# Patient Record
Sex: FEMALE | URBAN - NONMETROPOLITAN AREA
[De-identification: names, ages, dates, MRNs, and addresses within clinical notes are randomized per-mention and may not be internally consistent; named-entity substitution may affect disease eponyms.]

---

## 2020-11-17 ENCOUNTER — OFFICE VISIT (OUTPATIENT)
Dept: URBAN - NONMETROPOLITAN AREA CLINIC 13 | Facility: CLINIC | Age: 80
End: 2020-11-17
Payer: MEDICARE

## 2020-11-17 VITALS
HEART RATE: 80 BPM | BODY MASS INDEX: 41.22 KG/M2 | WEIGHT: 247.4 LBS | HEIGHT: 65 IN | DIASTOLIC BLOOD PRESSURE: 74 MMHG | SYSTOLIC BLOOD PRESSURE: 147 MMHG

## 2020-11-17 DIAGNOSIS — R19.00 ABDOMINAL SWELLING: ICD-10-CM

## 2020-11-17 DIAGNOSIS — Z12.11 COLON CANCER SCREENING: ICD-10-CM

## 2020-11-17 DIAGNOSIS — R19.7 DIARRHEA, UNSPECIFIED TYPE: ICD-10-CM

## 2020-11-17 PROCEDURE — G8420 CALC BMI NORM PARAMETERS: HCPCS | Performed by: NURSE PRACTITIONER

## 2020-11-17 PROCEDURE — G8427 DOCREV CUR MEDS BY ELIG CLIN: HCPCS | Performed by: NURSE PRACTITIONER

## 2020-11-17 PROCEDURE — 99204 OFFICE O/P NEW MOD 45 MIN: CPT | Performed by: NURSE PRACTITIONER

## 2020-11-17 PROCEDURE — G9903 PT SCRN TBCO ID AS NON USER: HCPCS | Performed by: NURSE PRACTITIONER

## 2020-11-17 RX ORDER — DORZOLAMIDE HCL 20 MG/ML
1 DROP INTO AFFECTED EYE SOLUTION/ DROPS OPHTHALMIC THREE TIMES A DAY
Status: ACTIVE | COMMUNITY

## 2020-11-17 RX ORDER — PSYLLIUM HUSK 0.4 G
1 CAPSULE WITH 8 OUNCES OF LIQUID AT BEDTIME CAPSULE ORAL DAILY
Qty: 30 | Refills: 6 | OUTPATIENT
Start: 2020-11-18 | End: 2021-06-16

## 2020-11-17 RX ORDER — COLESTIPOL HYDROCHLORIDE 1 G/1
1 TABLET 30MINS PRIOR TO A MEAL TABLET, FILM COATED ORAL BID
Qty: 60 | Refills: 6 | OUTPATIENT
Start: 2020-11-18

## 2020-11-17 RX ORDER — ICOSAPENT ETHYL 1000 MG/1
2 CAPSULES WITH MEALS CAPSULE ORAL TWICE A DAY
Status: ACTIVE | COMMUNITY

## 2020-11-17 RX ORDER — AMOXICILLIN 500 MG/1
1 CAPSULE CAPSULE ORAL
Status: ACTIVE | COMMUNITY

## 2020-11-17 RX ORDER — LEVOTHYROXINE SODIUM 0.11 MG/1
1 TABLET IN THE MORNING ON AN EMPTY STOMACH TABLET ORAL ONCE A DAY
Status: ACTIVE | COMMUNITY

## 2020-11-17 RX ORDER — FUROSEMIDE 40 MG/1
1 TABLET TABLET ORAL ONCE A DAY
Status: ACTIVE | COMMUNITY

## 2020-11-17 RX ORDER — PIOGLITAZONE 45 MG/1
1 TABLET TABLET ORAL ONCE A DAY
Status: ACTIVE | COMMUNITY

## 2020-11-17 RX ORDER — METFORMIN HYDROCHLORIDE 500 MG/1
1 TABLET WITH A MEAL TABLET, FILM COATED ORAL ONCE A DAY
Status: ACTIVE | COMMUNITY

## 2020-11-17 RX ORDER — TIMOLOL MALEATE 5 MG/ML
1 DROP INTO AFFECTED EYE SOLUTION OPHTHALMIC ONCE A DAY
Status: ACTIVE | COMMUNITY

## 2020-11-17 RX ORDER — CLOPIDOGREL 75 MG/1
1 TABLET TABLET, FILM COATED ORAL ONCE A DAY
Status: ACTIVE | COMMUNITY

## 2020-11-17 RX ORDER — PERPHENAZINE/AMITRIPTYLINE HCL 4 MG-25 MG
1 CAPSULE WITH A MEAL TABLET ORAL ONCE A DAY
Status: ACTIVE | COMMUNITY

## 2020-11-17 RX ORDER — ESCITALOPRAM 20 MG/1
1 TABLET TABLET, FILM COATED ORAL ONCE A DAY
Status: ACTIVE | COMMUNITY

## 2020-11-17 RX ORDER — LATANOPROST 50 UG/ML
1 DROP INTO AFFECTED EYE IN THE EVENING SOLUTION/ DROPS OPHTHALMIC ONCE A DAY
Status: ACTIVE | COMMUNITY

## 2020-11-17 RX ORDER — NITROGLYCERIN 0.4 MG/1
AS DIRECTED TABLET SUBLINGUAL
Status: ACTIVE | COMMUNITY

## 2020-11-17 RX ORDER — MAGNESIUM OXIDE 500 MG
AS DIRECTED TABLET ORAL
Status: ACTIVE | COMMUNITY

## 2020-11-17 RX ORDER — CARVEDILOL 3.12 MG/1
1 TABLET WITH FOOD TABLET, FILM COATED ORAL TWICE A DAY
Status: ACTIVE | COMMUNITY

## 2020-11-17 NOTE — HPI-TODAY'S VISIT:
11/17/2020 Ms. Tanika Peace is a 80 year old female here for diarrhea. She has been seeing MARISOL Gandara NP for this and has responded in the past to xifaxan. She has had bowel issues since she was a child. She knows that stress makes her GI symptoms worse. He son has been in the hospital. She will also have constipation. Today, she has not had a BM for 2 days. She was having lots of BM prior to this. She no longer has a GB. She has seen Dr. Linares for this and had a normal EGD and colonoscopy per her report. Last year, she was having trouble breathing and was found to have fluid around her lungs. She is now on diuretics. She denies any blood in her stool or melena. She has been having some moments of confusion and uses a walker. CS

## 2020-12-15 ENCOUNTER — OFFICE VISIT (OUTPATIENT)
Dept: URBAN - NONMETROPOLITAN AREA CLINIC 13 | Facility: CLINIC | Age: 80
End: 2020-12-15
Payer: MEDICARE

## 2020-12-15 ENCOUNTER — DASHBOARD ENCOUNTERS (OUTPATIENT)
Age: 80
End: 2020-12-15

## 2020-12-15 ENCOUNTER — LAB OUTSIDE AN ENCOUNTER (OUTPATIENT)
Dept: URBAN - NONMETROPOLITAN AREA CLINIC 13 | Facility: CLINIC | Age: 80
End: 2020-12-15

## 2020-12-15 ENCOUNTER — OFFICE VISIT (OUTPATIENT)
Dept: URBAN - NONMETROPOLITAN AREA CLINIC 13 | Facility: CLINIC | Age: 80
End: 2020-12-15

## 2020-12-15 VITALS
WEIGHT: 236.8 LBS | HEIGHT: 65 IN | DIASTOLIC BLOOD PRESSURE: 79 MMHG | HEART RATE: 81 BPM | SYSTOLIC BLOOD PRESSURE: 191 MMHG | BODY MASS INDEX: 39.45 KG/M2

## 2020-12-15 DIAGNOSIS — K21.9 GERD WITHOUT ESOPHAGITIS: ICD-10-CM

## 2020-12-15 DIAGNOSIS — Z12.11 COLON CANCER SCREENING: ICD-10-CM

## 2020-12-15 DIAGNOSIS — R19.7 DIARRHEA, UNSPECIFIED TYPE: ICD-10-CM

## 2020-12-15 DIAGNOSIS — D50.9 IRON DEFICIENCY ANEMIA, UNSPECIFIED IRON DEFICIENCY ANEMIA TYPE: ICD-10-CM

## 2020-12-15 DIAGNOSIS — R19.00 ABDOMINAL SWELLING: ICD-10-CM

## 2020-12-15 PROBLEM — 87522002: Status: ACTIVE | Noted: 2020-12-15

## 2020-12-15 PROBLEM — 266435005: Status: ACTIVE | Noted: 2020-12-15

## 2020-12-15 PROCEDURE — G8427 DOCREV CUR MEDS BY ELIG CLIN: HCPCS | Performed by: NURSE PRACTITIONER

## 2020-12-15 PROCEDURE — G9903 PT SCRN TBCO ID AS NON USER: HCPCS | Performed by: NURSE PRACTITIONER

## 2020-12-15 PROCEDURE — 99213 OFFICE O/P EST LOW 20 MIN: CPT | Performed by: NURSE PRACTITIONER

## 2020-12-15 PROCEDURE — G8420 CALC BMI NORM PARAMETERS: HCPCS | Performed by: NURSE PRACTITIONER

## 2020-12-15 RX ORDER — NITROGLYCERIN 0.4 MG/1
AS DIRECTED TABLET SUBLINGUAL
Status: ACTIVE | COMMUNITY

## 2020-12-15 RX ORDER — COLESTIPOL HYDROCHLORIDE 1 G/1
1 TABLET 30MINS PRIOR TO A MEAL TABLET, FILM COATED ORAL BID
Qty: 60 TABLET | Refills: 6 | OUTPATIENT

## 2020-12-15 RX ORDER — CARVEDILOL 3.12 MG/1
1 TABLET WITH FOOD TABLET, FILM COATED ORAL TWICE A DAY
Status: ACTIVE | COMMUNITY

## 2020-12-15 RX ORDER — AMOXICILLIN 500 MG/1
1 CAPSULE CAPSULE ORAL
Status: ACTIVE | COMMUNITY

## 2020-12-15 RX ORDER — PSYLLIUM HUSK 0.4 G
1 CAPSULE WITH 8 OUNCES OF LIQUID AT BEDTIME CAPSULE ORAL DAILY
Qty: 30 | Refills: 6 | OUTPATIENT

## 2020-12-15 RX ORDER — LEVOTHYROXINE SODIUM 0.11 MG/1
1 TABLET IN THE MORNING ON AN EMPTY STOMACH TABLET ORAL ONCE A DAY
Status: ACTIVE | COMMUNITY

## 2020-12-15 RX ORDER — FUROSEMIDE 40 MG/1
1 TABLET TABLET ORAL ONCE A DAY
Status: ACTIVE | COMMUNITY

## 2020-12-15 RX ORDER — PSYLLIUM HUSK 0.4 G
1 CAPSULE WITH 8 OUNCES OF LIQUID AT BEDTIME CAPSULE ORAL DAILY
Qty: 30 | Refills: 6 | Status: ACTIVE | COMMUNITY
Start: 2020-11-18 | End: 2021-06-16

## 2020-12-15 RX ORDER — ESCITALOPRAM 20 MG/1
1 TABLET TABLET, FILM COATED ORAL ONCE A DAY
Status: ACTIVE | COMMUNITY

## 2020-12-15 RX ORDER — MAGNESIUM OXIDE 500 MG
AS DIRECTED TABLET ORAL
Status: ACTIVE | COMMUNITY

## 2020-12-15 RX ORDER — DORZOLAMIDE HCL 20 MG/ML
1 DROP INTO AFFECTED EYE SOLUTION/ DROPS OPHTHALMIC THREE TIMES A DAY
Status: ACTIVE | COMMUNITY

## 2020-12-15 RX ORDER — CLOPIDOGREL 75 MG/1
1 TABLET TABLET, FILM COATED ORAL ONCE A DAY
Status: ACTIVE | COMMUNITY

## 2020-12-15 RX ORDER — ICOSAPENT ETHYL 1000 MG/1
2 CAPSULES WITH MEALS CAPSULE ORAL TWICE A DAY
Status: ACTIVE | COMMUNITY

## 2020-12-15 RX ORDER — TIMOLOL MALEATE 5 MG/ML
1 DROP INTO AFFECTED EYE SOLUTION OPHTHALMIC ONCE A DAY
Status: ACTIVE | COMMUNITY

## 2020-12-15 RX ORDER — COLESTIPOL HYDROCHLORIDE 1 G/1
1 TABLET 30MINS PRIOR TO A MEAL TABLET, FILM COATED ORAL BID
Qty: 60 | Refills: 6 | Status: ACTIVE | COMMUNITY
Start: 2020-11-18

## 2020-12-15 RX ORDER — PERPHENAZINE/AMITRIPTYLINE HCL 4 MG-25 MG
1 CAPSULE WITH A MEAL TABLET ORAL ONCE A DAY
Status: ACTIVE | COMMUNITY

## 2020-12-15 RX ORDER — METFORMIN HYDROCHLORIDE 500 MG/1
1 TABLET WITH A MEAL TABLET, FILM COATED ORAL ONCE A DAY
Status: ACTIVE | COMMUNITY

## 2020-12-15 RX ORDER — PIOGLITAZONE 45 MG/1
1 TABLET TABLET ORAL ONCE A DAY
Status: ACTIVE | COMMUNITY

## 2020-12-15 RX ORDER — LATANOPROST 50 UG/ML
1 DROP INTO AFFECTED EYE IN THE EVENING SOLUTION/ DROPS OPHTHALMIC ONCE A DAY
Status: ACTIVE | COMMUNITY

## 2020-12-15 NOTE — HPI-TODAY'S VISIT:
12/15/2020 Ms. Tanika Peace is here for f/u of diarrhea. At her last OV, she was having constipation to diarrhea. She was given fiber at night and advised to take miralax if  no BM for 2 days and to take colestipol if having diarrhea 1-2 times a day. She did not start these. She is now having diarrhea only. She has stopped her diuretics because she was concerned this was causing her diarrhea. This did not change things. She is mildly anemic with a Hbg of 11.7. She denies any blood in her stool or melena. She has reflux and taking omeprazole. She has nausea and loss of appetite. She is concernd she has hpylori. She is very frustrated with her symptoms. CS

## 2020-12-15 NOTE — PHYSICAL EXAM CONSTITUTIONAL:
well developed, well nourished , in no acute distress , ambulating with difficulty, use of a walker , normal communication ability

## 2021-01-14 ENCOUNTER — OFFICE VISIT (OUTPATIENT)
Dept: URBAN - NONMETROPOLITAN AREA SURGERY CENTER 1 | Facility: SURGERY CENTER | Age: 81
End: 2021-01-14

## 2021-10-15 ENCOUNTER — ERX REFILL RESPONSE (OUTPATIENT)
Dept: URBAN - NONMETROPOLITAN AREA CLINIC 2 | Facility: CLINIC | Age: 81
End: 2021-10-15

## 2021-10-15 RX ORDER — COLESTIPOL HYDROCHLORIDE 1 G/1
TAKE 1 TABLET BY MOUTH TWICE DAILY 30 MINUTES BEFORE A MEAL TABLET ORAL
Qty: 60 TABLET | Refills: 12 | OUTPATIENT

## 2021-10-15 RX ORDER — COLESTIPOL HYDROCHLORIDE 1 G/1
TAKE 1 TABLET BY MOUTH TWICE DAILY 30 MINUTES BEFORE A MEAL TABLET ORAL
Qty: 60 TABLET | Refills: 6 | OUTPATIENT

## 2021-11-22 ENCOUNTER — TELEPHONE ENCOUNTER (OUTPATIENT)
Dept: URBAN - NONMETROPOLITAN AREA CLINIC 13 | Facility: CLINIC | Age: 81
End: 2021-11-22

## 2021-11-22 RX ORDER — PSYLLIUM HUSK 0.4 G
1 CAPSULE WITH 8 OUNCES OF LIQUID AT BEDTIME CAPSULE ORAL DAILY
Qty: 30 | Refills: 6
End: 2022-06-20

## 2022-01-26 ENCOUNTER — OFFICE VISIT (OUTPATIENT)
Dept: URBAN - NONMETROPOLITAN AREA CLINIC 2 | Facility: CLINIC | Age: 82
End: 2022-01-26